# Patient Record
Sex: MALE | Race: WHITE | NOT HISPANIC OR LATINO | URBAN - METROPOLITAN AREA
[De-identification: names, ages, dates, MRNs, and addresses within clinical notes are randomized per-mention and may not be internally consistent; named-entity substitution may affect disease eponyms.]

---

## 2017-10-17 ENCOUNTER — FOLLOW UP ESTABLISHED (OUTPATIENT)
Dept: URBAN - METROPOLITAN AREA CLINIC 30 | Facility: CLINIC | Age: 57
End: 2017-10-17
Payer: COMMERCIAL

## 2017-10-17 DIAGNOSIS — Z98.890 OTHER SPECIFIED POSTPROCEDURAL STATES: ICD-10-CM

## 2017-10-17 DIAGNOSIS — H43.393 OTHER VITREOUS OPACITIES, BILATERAL: ICD-10-CM

## 2017-10-17 DIAGNOSIS — H26.493 OTHER SECONDARY CATARACT, BILATERAL: Primary | ICD-10-CM

## 2017-10-17 PROCEDURE — 92014 COMPRE OPH EXAM EST PT 1/>: CPT | Performed by: OPTOMETRIST

## 2017-10-17 PROCEDURE — 92134 CPTRZ OPH DX IMG PST SGM RTA: CPT | Performed by: OPTOMETRIST

## 2017-10-17 PROCEDURE — 92015 DETERMINE REFRACTIVE STATE: CPT | Performed by: OPTOMETRIST

## 2017-10-17 ASSESSMENT — VISUAL ACUITY
OD: 20/25
OS: 20/20

## 2017-10-17 ASSESSMENT — KERATOMETRY
OD: 41.19
OS: 36.95

## 2017-10-17 ASSESSMENT — INTRAOCULAR PRESSURE
OS: 16
OD: 18

## 2020-04-28 ENCOUNTER — FOLLOW UP ESTABLISHED (OUTPATIENT)
Dept: URBAN - METROPOLITAN AREA CLINIC 30 | Facility: CLINIC | Age: 60
End: 2020-04-28
Payer: COMMERCIAL

## 2020-04-28 DIAGNOSIS — H44.23 DEGENERATIVE MYOPIA, BILATERAL: ICD-10-CM

## 2020-04-28 PROCEDURE — 92134 CPTRZ OPH DX IMG PST SGM RTA: CPT | Performed by: OPTOMETRIST

## 2020-04-28 PROCEDURE — 92014 COMPRE OPH EXAM EST PT 1/>: CPT | Performed by: OPTOMETRIST

## 2020-04-28 ASSESSMENT — VISUAL ACUITY
OD: 20/50
OS: 20/30

## 2020-04-28 ASSESSMENT — KERATOMETRY
OS: 37.22
OD: 41.24

## 2020-04-28 ASSESSMENT — INTRAOCULAR PRESSURE
OS: 18
OD: 20

## 2020-06-09 ENCOUNTER — Encounter (OUTPATIENT)
Dept: URBAN - METROPOLITAN AREA CLINIC 30 | Facility: CLINIC | Age: 60
End: 2020-06-09
Payer: COMMERCIAL

## 2020-06-09 DIAGNOSIS — Z01.818 ENCOUNTER FOR OTHER PREPROCEDURAL EXAMINATION: Primary | ICD-10-CM

## 2020-06-09 PROCEDURE — 99213 OFFICE O/P EST LOW 20 MIN: CPT | Performed by: PHYSICIAN ASSISTANT

## 2020-06-09 RX ORDER — FINASTERIDE 5 MG/1
5 MG TABLET, FILM COATED ORAL
Refills: 0 | Status: ACTIVE
Start: 2020-06-09

## 2020-06-09 RX ORDER — LISINOPRIL 40 MG/1
40 MG TABLET ORAL
Refills: 0 | Status: ACTIVE
Start: 2020-06-09

## 2020-06-15 ENCOUNTER — SURGERY (OUTPATIENT)
Dept: URBAN - METROPOLITAN AREA SURGERY 12 | Facility: SURGERY | Age: 60
End: 2020-06-15
Payer: COMMERCIAL

## 2020-06-15 PROCEDURE — 66821 AFTER CATARACT LASER SURGERY: CPT | Performed by: OPHTHALMOLOGY

## 2020-06-29 ENCOUNTER — SURGERY (OUTPATIENT)
Dept: URBAN - METROPOLITAN AREA SURGERY 12 | Facility: SURGERY | Age: 60
End: 2020-06-29
Payer: COMMERCIAL

## 2020-06-29 PROCEDURE — 66821 AFTER CATARACT LASER SURGERY: CPT | Performed by: OPHTHALMOLOGY

## 2021-11-02 ENCOUNTER — OFFICE VISIT (OUTPATIENT)
Dept: URBAN - METROPOLITAN AREA CLINIC 30 | Facility: CLINIC | Age: 61
End: 2021-11-02
Payer: COMMERCIAL

## 2021-11-02 PROCEDURE — 92134 CPTRZ OPH DX IMG PST SGM RTA: CPT | Performed by: OPTOMETRIST

## 2021-11-02 PROCEDURE — 92014 COMPRE OPH EXAM EST PT 1/>: CPT | Performed by: OPTOMETRIST

## 2021-11-02 ASSESSMENT — KERATOMETRY
OD: 41.52
OS: 37.45

## 2021-11-02 ASSESSMENT — INTRAOCULAR PRESSURE
OS: 18
OD: 17

## 2021-11-02 ASSESSMENT — VISUAL ACUITY
OD: 20/30
OS: 20/30

## 2021-11-02 NOTE — IMPRESSION/PLAN
Impression: Degenerative myopia, bilateral Plan: High Myope prion to Lasik/ RK OU. BCVA is limited. Generated new SRX today.

## 2021-11-02 NOTE — IMPRESSION/PLAN
Impression: Other vitreous opacities, bilateral Plan: Pt noted floaters after YAG OU. Retina exam appears stable. Signs and symptoms of RD reviewed. RTC STAT if any increased in floaters or loss of VA. See mac oct for findings.

## 2022-11-03 ENCOUNTER — OFFICE VISIT (OUTPATIENT)
Dept: URBAN - METROPOLITAN AREA CLINIC 30 | Facility: CLINIC | Age: 62
End: 2022-11-03
Payer: COMMERCIAL

## 2022-11-03 DIAGNOSIS — H43.393 OTHER VITREOUS OPACITIES, BILATERAL: ICD-10-CM

## 2022-11-03 DIAGNOSIS — H44.23 DEGENERATIVE MYOPIA, BILATERAL: ICD-10-CM

## 2022-11-03 DIAGNOSIS — H52.4 PRESBYOPIA: ICD-10-CM

## 2022-11-03 DIAGNOSIS — Z98.890 OTHER SPECIFIED POSTPROCEDURAL STATES: Primary | ICD-10-CM

## 2022-11-03 PROCEDURE — 92134 CPTRZ OPH DX IMG PST SGM RTA: CPT | Performed by: OPTOMETRIST

## 2022-11-03 PROCEDURE — 92014 COMPRE OPH EXAM EST PT 1/>: CPT | Performed by: OPTOMETRIST

## 2022-11-03 ASSESSMENT — INTRAOCULAR PRESSURE
OD: 18
OS: 18

## 2022-11-03 ASSESSMENT — VISUAL ACUITY
OD: 20/25
OS: 20/30

## 2022-11-03 ASSESSMENT — KERATOMETRY
OS: 37.28
OD: 41.44

## 2022-11-03 NOTE — IMPRESSION/PLAN
Impression: Degenerative myopia, bilateral Plan: High Myope prior to Lasik/ RK OU. BCVA is limited. Mac OCT is WNL - stable OU. Generated new SRX today.

## 2023-11-07 ENCOUNTER — OFFICE VISIT (OUTPATIENT)
Dept: URBAN - METROPOLITAN AREA CLINIC 30 | Facility: CLINIC | Age: 63
End: 2023-11-07
Payer: COMMERCIAL

## 2023-11-07 DIAGNOSIS — H43.393 OTHER VITREOUS OPACITIES, BILATERAL: Primary | ICD-10-CM

## 2023-11-07 DIAGNOSIS — H44.23 DEGENERATIVE MYOPIA, BILATERAL: ICD-10-CM

## 2023-11-07 DIAGNOSIS — Z98.890 OTHER SPECIFIED POSTPROCEDURAL STATES: ICD-10-CM

## 2023-11-07 PROCEDURE — 99213 OFFICE O/P EST LOW 20 MIN: CPT | Performed by: OPTOMETRIST

## 2023-11-07 PROCEDURE — 92134 CPTRZ OPH DX IMG PST SGM RTA: CPT | Performed by: OPTOMETRIST

## 2023-11-07 ASSESSMENT — INTRAOCULAR PRESSURE
OD: 16
OS: 16

## 2023-11-07 ASSESSMENT — KERATOMETRY
OD: 41.29
OS: 36.78

## 2023-11-07 ASSESSMENT — VISUAL ACUITY
OD: 20/30
OS: 20/30

## 2024-11-07 ENCOUNTER — OFFICE VISIT (OUTPATIENT)
Dept: URBAN - METROPOLITAN AREA CLINIC 30 | Facility: CLINIC | Age: 64
End: 2024-11-07
Payer: COMMERCIAL

## 2024-11-07 DIAGNOSIS — H44.23 DEGENERATIVE MYOPIA, BILATERAL: ICD-10-CM

## 2024-11-07 DIAGNOSIS — Z98.890 OTHER SPECIFIED POSTPROCEDURAL STATES: Primary | ICD-10-CM

## 2024-11-07 DIAGNOSIS — H43.393 OTHER VITREOUS OPACITIES, BILATERAL: ICD-10-CM

## 2024-11-07 DIAGNOSIS — H52.4 PRESBYOPIA: ICD-10-CM

## 2024-11-07 PROCEDURE — 92014 COMPRE OPH EXAM EST PT 1/>: CPT | Performed by: OPTOMETRIST

## 2024-11-07 PROCEDURE — 92134 CPTRZ OPH DX IMG PST SGM RTA: CPT | Performed by: OPTOMETRIST

## 2024-11-07 ASSESSMENT — KERATOMETRY
OS: 36.88
OD: 41.00

## 2024-11-07 ASSESSMENT — INTRAOCULAR PRESSURE
OD: 17
OS: 18

## 2024-11-07 ASSESSMENT — VISUAL ACUITY
OS: 20/30
OD: 20/25